# Patient Record
Sex: MALE | URBAN - METROPOLITAN AREA
[De-identification: names, ages, dates, MRNs, and addresses within clinical notes are randomized per-mention and may not be internally consistent; named-entity substitution may affect disease eponyms.]

---

## 2024-03-14 ENCOUNTER — OFFICE VISIT (OUTPATIENT)
Dept: PRIMARY CARE | Facility: HOSPITAL | Age: 54
End: 2024-03-14
Payer: COMMERCIAL

## 2024-03-14 VITALS
HEART RATE: 95 BPM | DIASTOLIC BLOOD PRESSURE: 81 MMHG | HEIGHT: 72 IN | BODY MASS INDEX: 22.62 KG/M2 | SYSTOLIC BLOOD PRESSURE: 128 MMHG | TEMPERATURE: 97.1 F | OXYGEN SATURATION: 93 % | WEIGHT: 167 LBS

## 2024-03-14 DIAGNOSIS — F17.200 SMOKING: ICD-10-CM

## 2024-03-14 DIAGNOSIS — E03.9 HYPOTHYROIDISM, UNSPECIFIED TYPE: ICD-10-CM

## 2024-03-14 DIAGNOSIS — E55.9 VITAMIN D DEFICIENCY: ICD-10-CM

## 2024-03-14 DIAGNOSIS — G89.29 OTHER CHRONIC PAIN: ICD-10-CM

## 2024-03-14 DIAGNOSIS — R79.89 LOW TESTOSTERONE: ICD-10-CM

## 2024-03-14 DIAGNOSIS — R06.02 SHORTNESS OF BREATH: Primary | ICD-10-CM

## 2024-03-14 DIAGNOSIS — R35.0 BENIGN PROSTATIC HYPERPLASIA WITH URINARY FREQUENCY: ICD-10-CM

## 2024-03-14 DIAGNOSIS — N40.1 BENIGN PROSTATIC HYPERPLASIA WITH URINARY FREQUENCY: ICD-10-CM

## 2024-03-14 DIAGNOSIS — E78.5 DYSLIPIDEMIA: ICD-10-CM

## 2024-03-14 DIAGNOSIS — R05.2 SUBACUTE COUGH: ICD-10-CM

## 2024-03-14 PROBLEM — G54.8 PHANTOM PAIN: Status: ACTIVE | Noted: 2024-03-14

## 2024-03-14 PROBLEM — R31.9 HEMATURIA: Status: ACTIVE | Noted: 2024-03-14

## 2024-03-14 PROBLEM — R52 PHANTOM PAIN: Status: ACTIVE | Noted: 2024-03-14

## 2024-03-14 PROBLEM — C41.9 OSTEOSARCOMA (MULTI): Status: ACTIVE | Noted: 2024-03-14

## 2024-03-14 PROBLEM — F98.8 ATTENTION DEFICIT DISORDER: Status: ACTIVE | Noted: 2024-03-14

## 2024-03-14 PROCEDURE — 99205 OFFICE O/P NEW HI 60 MIN: CPT | Performed by: INTERNAL MEDICINE

## 2024-03-14 PROCEDURE — 99215 OFFICE O/P EST HI 40 MIN: CPT | Performed by: INTERNAL MEDICINE

## 2024-03-14 RX ORDER — DEXTROAMPHETAMINE SACCHARATE, AMPHETAMINE ASPARTATE, DEXTROAMPHETAMINE SULFATE AND AMPHETAMINE SULFATE 5; 5; 5; 5 MG/1; MG/1; MG/1; MG/1
1 TABLET ORAL 3 TIMES DAILY
COMMUNITY
Start: 2024-03-07

## 2024-03-14 RX ORDER — BUPRENORPHINE AND NALOXONE 8; 2 MG/1; MG/1
FILM, SOLUBLE BUCCAL; SUBLINGUAL
COMMUNITY

## 2024-03-14 RX ORDER — DULOXETIN HYDROCHLORIDE 30 MG/1
30 CAPSULE, DELAYED RELEASE ORAL DAILY
COMMUNITY
Start: 2024-03-04

## 2024-03-14 RX ORDER — TESTOSTERONE CYPIONATE 200 MG/ML
INJECTION, SOLUTION INTRAMUSCULAR
COMMUNITY

## 2024-03-14 RX ORDER — AZITHROMYCIN 250 MG/1
TABLET, FILM COATED ORAL
Qty: 6 TABLET | Refills: 0 | Status: SHIPPED | OUTPATIENT
Start: 2024-03-14 | End: 2024-03-19

## 2024-03-14 RX ORDER — METHYLPREDNISOLONE 4 MG/1
TABLET ORAL
Qty: 21 TABLET | Refills: 0 | Status: SHIPPED | OUTPATIENT
Start: 2024-03-14 | End: 2024-03-21

## 2024-03-14 ASSESSMENT — ENCOUNTER SYMPTOMS
LOSS OF SENSATION IN FEET: 0
OCCASIONAL FEELINGS OF UNSTEADINESS: 0
DEPRESSION: 0

## 2024-03-14 NOTE — PROGRESS NOTES
Chief Complaint   Patient presents with    [facility]         History Of Present Illness:    Hitesh Cruz is a 53 y.o. male presenting to [facility] and address concerns about cough, shortness of breath and cancer screening.  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  He was treated with cisplatin based chemotherapy and Adriamycin.  He recently relocated to [place] and is interested in updating cancer screening.  Recently has been coughing more and feeling out of breath, and is interested in arranging a CAT scan of his chest.  He developed attention deficit disorder after his chemotherapy, and is currently managed with Adderall.  He has a history of low testosterone currently on testosterone therapy managed by an outside provider.  He reports a history of a thyroid disorder.  One of his PET scans demonstrated increased uptake in his thyroid at the time of his osteosarcoma surgery.  He did not have any follow-up for his thyroid after that.  There is a normal TSH in his chart from 1 year ago.  Hitesh continues to have significant phantom pain in his right leg.  This is managed by pain specialist.  He is currently treated with duloxetine and Suboxone.  He does not feel that the duloxetine is effective, but he is having difficulty tapering off of it.  If he skips a dose he gets unusual sensations in his brain and sensations of flashing light.      Active Medical Problems:  Patient Active Problem List    Diagnosis Date Noted    Shortness of breath 03/17/2024    Other chronic pain 03/17/2024    Vitamin D deficiency 03/17/2024    Low testosterone 03/17/2024    Hypothyroidism 03/17/2024    Benign prostatic hyperplasia with urinary frequency 03/17/2024    Hematuria 03/14/2024    Osteosarcoma (CMS/HCC) 03/14/2024    Smoking 03/14/2024    Dyslipidemia  03/14/2024    Attention deficit disorder 03/14/2024    Phantom pain (CMS/HCC) 03/14/2024       Past Medical History:  Past Medical History:   Diagnosis Date    Attention deficit disorder     Hematuria     Hyperlipidemia     Osteosarcoma (CMS/HCC)     Phantom pain (CMS/HCC)     Smoking     self rolled       Past Surgical History:  Past Surgical History:   Procedure Laterality Date    LEG AMPUTATION AT HIP Right     LEG SURGERY Right     Desmoid tumor, age 14         Social History:  Social History     Tobacco Use    Smoking status: Some Days     Types: Cigarettes    Smokeless tobacco: Never   Substance Use Topics    Alcohol use: Never    Drug use: Never         Family History:  Family History   Problem Relation Name Age of Onset    Chronic bronchitis Mother      Rheum arthritis Mother      Arrhythmia Father      Transient ischemic attack Father      No Known Problems Sister      Asperger's syndrome Daughter          Allergies:  Patient has no known allergies.    Outpatient Medications:    Current Outpatient Medications:     amphetamine-dextroamphetamine (Adderall) 20 mg tablet, Take 1 tablet (20 mg) by mouth 3 times a day., Disp: , Rfl:     DULoxetine (Cymbalta) 30 mg DR capsule, Take 1 capsule (30 mg) by mouth once daily., Disp: , Rfl:     azithromycin (Zithromax) 250 mg tablet, Take 2 tablets (500 mg) by mouth once daily for 1 day, THEN 1 tablet (250 mg) once daily for 4 days. Take 2 tabs (500 mg) by mouth today, than 1 daily for 4 days.., Disp: 6 tablet, Rfl: 0    buprenorphine-naloxone (Suboxone) 8-2 mg SL film, TAKE 1 FILM DAILY AND 1/2 FILM EXTRA AS NEEDED FOR PAIN, Disp: , Rfl:     methylPREDNISolone (Medrol Dospak) 4 mg tablets, Take as directed on package., Disp: 21 tablet, Rfl: 0    testosterone cypionate (Depo-Testosterone) 200 mg/mL injection, Inject into the muscle every 14 (fourteen) days., Disp: , Rfl:     Review of Systems:  Pertinent positives in review of systems outlined above.  Complete ROS  otherwise negative.         Last Recorded Vitals:  Vitals:    03/14/24 1200   BP: 128/81   BP Location: Left arm   Patient Position: Sitting   BP Cuff Size: Large adult   Pulse: 95   Temp: 36.2 °C (97.1 °F)   SpO2: 93%   Weight: 75.8 kg (167 lb)   Height: 1.829 m (6')   Body mass index is 22.65 kg/m².            Assessment/Plan   Problem List Items Addressed This Visit       Smoking     Advised on complete tobacco cessation.  Will arrange LD CT chest for screening.          Relevant Orders    CT lung screening low dose    Dyslipidemia     Fasting lipids due now          Relevant Orders    Comprehensive Metabolic Panel    Lipid Panel    Shortness of breath - Primary     Diffuse wheezing and rhales on exam. Will treat with azithromycin and medrol.  To use albuterol prn cough.  To follow up in one week.  LDCT ordered for lung CA screen.         Relevant Orders    Comprehensive Metabolic Panel    CBC and Auto Differential    Other chronic pain    Relevant Orders    Comprehensive Metabolic Panel    Vitamin D deficiency     25OHD level to be included with labs.  Will arrange DXA at next visit.  Low D and smoking increase risk for metabolic bone disease.          Relevant Orders    Comprehensive Metabolic Panel    Vitamin D 25-Hydroxy,Total (for eval of Vitamin D levels)    Low testosterone     Has hx of low T managed by outside provider.  Inconsistent with injections.           Relevant Orders    Comprehensive Metabolic Panel    Hypothyroidism     Has hx of FDG avid nodule(s) on PET in past.  No mass in thyroid on exam.  Will start with TSH and Free T3         Relevant Orders    TSH with reflex to Free T4 if abnormal    Benign prostatic hyperplasia with urinary frequency    Relevant Orders    Prostate Specific Antigen    Urinalysis with Reflex Microscopic     Other Visit Diagnoses       Subacute cough        Relevant Medications    methylPREDNISolone (Medrol Dospak) 4 mg tablets    azithromycin (Zithromax) 250 mg tablet               A total of 60 minutes was spent reviewing the chart and recent testing and discussing plan of care.     Randall Caldwell MD

## 2024-03-17 PROBLEM — R35.0 BENIGN PROSTATIC HYPERPLASIA WITH URINARY FREQUENCY: Status: ACTIVE | Noted: 2024-03-17

## 2024-03-17 PROBLEM — E55.9 VITAMIN D DEFICIENCY: Status: ACTIVE | Noted: 2024-03-17

## 2024-03-17 PROBLEM — G89.29 OTHER CHRONIC PAIN: Status: ACTIVE | Noted: 2024-03-17

## 2024-03-17 PROBLEM — N40.1 BENIGN PROSTATIC HYPERPLASIA WITH URINARY FREQUENCY: Status: ACTIVE | Noted: 2024-03-17

## 2024-03-17 PROBLEM — R79.89 LOW TESTOSTERONE: Status: ACTIVE | Noted: 2024-03-17

## 2024-03-17 PROBLEM — E03.9 HYPOTHYROIDISM: Status: ACTIVE | Noted: 2024-03-17

## 2024-03-17 PROBLEM — R06.02 SHORTNESS OF BREATH: Status: ACTIVE | Noted: 2024-03-17

## 2024-03-17 NOTE — ASSESSMENT & PLAN NOTE
Diffuse wheezing and rhales on exam. Will treat with azithromycin and medrol.  To use albuterol prn cough.  To follow up in one week.  LDCT ordered for lung CA screen.

## 2024-03-17 NOTE — ASSESSMENT & PLAN NOTE
25OHD level to be included with labs.  Will arrange DXA at next visit.  Low D and smoking increase risk for metabolic bone disease.

## 2024-03-17 NOTE — ASSESSMENT & PLAN NOTE
Has hx of FDG avid nodule(s) on PET in past.  No mass in thyroid on exam.  Will start with TSH and Free T3

## 2024-04-01 ENCOUNTER — LAB (OUTPATIENT)
Dept: LAB | Facility: LAB | Age: 54
End: 2024-04-01
Payer: COMMERCIAL

## 2024-04-01 DIAGNOSIS — N40.1 BENIGN PROSTATIC HYPERPLASIA WITH URINARY FREQUENCY: ICD-10-CM

## 2024-04-01 DIAGNOSIS — R79.89 LOW TESTOSTERONE: ICD-10-CM

## 2024-04-01 DIAGNOSIS — G89.29 OTHER CHRONIC PAIN: ICD-10-CM

## 2024-04-01 DIAGNOSIS — R06.02 SHORTNESS OF BREATH: ICD-10-CM

## 2024-04-01 DIAGNOSIS — E78.5 DYSLIPIDEMIA: ICD-10-CM

## 2024-04-01 DIAGNOSIS — E03.9 HYPOTHYROIDISM, UNSPECIFIED TYPE: ICD-10-CM

## 2024-04-01 DIAGNOSIS — R35.0 BENIGN PROSTATIC HYPERPLASIA WITH URINARY FREQUENCY: ICD-10-CM

## 2024-04-01 DIAGNOSIS — E55.9 VITAMIN D DEFICIENCY: ICD-10-CM

## 2024-04-01 LAB
25(OH)D3 SERPL-MCNC: 31 NG/ML (ref 30–100)
ALBUMIN SERPL BCP-MCNC: 4.1 G/DL (ref 3.4–5)
ALP SERPL-CCNC: 72 U/L (ref 33–120)
ALT SERPL W P-5'-P-CCNC: 14 U/L (ref 10–52)
ANION GAP SERPL CALC-SCNC: 10 MMOL/L (ref 10–20)
AST SERPL W P-5'-P-CCNC: 12 U/L (ref 9–39)
BASOPHILS # BLD AUTO: 0.09 X10*3/UL (ref 0–0.1)
BASOPHILS NFR BLD AUTO: 0.9 %
BILIRUB SERPL-MCNC: 0.4 MG/DL (ref 0–1.2)
BUN SERPL-MCNC: 12 MG/DL (ref 6–23)
CALCIUM SERPL-MCNC: 9.3 MG/DL (ref 8.6–10.3)
CHLORIDE SERPL-SCNC: 103 MMOL/L (ref 98–107)
CHOLEST SERPL-MCNC: 243 MG/DL (ref 0–199)
CHOLESTEROL/HDL RATIO: 6.2
CO2 SERPL-SCNC: 29 MMOL/L (ref 21–32)
CREAT SERPL-MCNC: 0.81 MG/DL (ref 0.5–1.3)
EGFRCR SERPLBLD CKD-EPI 2021: >90 ML/MIN/1.73M*2
EOSINOPHIL # BLD AUTO: 0.17 X10*3/UL (ref 0–0.7)
EOSINOPHIL NFR BLD AUTO: 1.7 %
ERYTHROCYTE [DISTWIDTH] IN BLOOD BY AUTOMATED COUNT: 13.7 % (ref 11.5–14.5)
GLUCOSE SERPL-MCNC: 105 MG/DL (ref 74–99)
HCT VFR BLD AUTO: 45.8 % (ref 41–52)
HDLC SERPL-MCNC: 38.9 MG/DL
HGB BLD-MCNC: 15 G/DL (ref 13.5–17.5)
IMM GRANULOCYTES # BLD AUTO: 0.03 X10*3/UL (ref 0–0.7)
IMM GRANULOCYTES NFR BLD AUTO: 0.3 % (ref 0–0.9)
LDLC SERPL CALC-MCNC: ABNORMAL MG/DL
LYMPHOCYTES # BLD AUTO: 1.68 X10*3/UL (ref 1.2–4.8)
LYMPHOCYTES NFR BLD AUTO: 16.9 %
MCH RBC QN AUTO: 30.2 PG (ref 26–34)
MCHC RBC AUTO-ENTMCNC: 32.8 G/DL (ref 32–36)
MCV RBC AUTO: 92 FL (ref 80–100)
MONOCYTES # BLD AUTO: 0.7 X10*3/UL (ref 0.1–1)
MONOCYTES NFR BLD AUTO: 7.1 %
NEUTROPHILS # BLD AUTO: 7.25 X10*3/UL (ref 1.2–7.7)
NEUTROPHILS NFR BLD AUTO: 73.1 %
NON HDL CHOLESTEROL: 204 MG/DL (ref 0–149)
NRBC BLD-RTO: 0 /100 WBCS (ref 0–0)
PLATELET # BLD AUTO: 255 X10*3/UL (ref 150–450)
POTASSIUM SERPL-SCNC: 4 MMOL/L (ref 3.5–5.3)
PROT SERPL-MCNC: 6.4 G/DL (ref 6.4–8.2)
PSA SERPL-MCNC: 1.47 NG/ML
RBC # BLD AUTO: 4.96 X10*6/UL (ref 4.5–5.9)
SODIUM SERPL-SCNC: 138 MMOL/L (ref 136–145)
TRIGL SERPL-MCNC: 422 MG/DL (ref 0–149)
TSH SERPL-ACNC: 0.86 MIU/L (ref 0.44–3.98)
VLDL: ABNORMAL
WBC # BLD AUTO: 9.9 X10*3/UL (ref 4.4–11.3)

## 2024-04-01 PROCEDURE — 80053 COMPREHEN METABOLIC PANEL: CPT

## 2024-04-01 PROCEDURE — 82306 VITAMIN D 25 HYDROXY: CPT

## 2024-04-01 PROCEDURE — 84443 ASSAY THYROID STIM HORMONE: CPT

## 2024-04-01 PROCEDURE — 80061 LIPID PANEL: CPT

## 2024-04-01 PROCEDURE — 84153 ASSAY OF PSA TOTAL: CPT

## 2024-04-01 PROCEDURE — 36415 COLL VENOUS BLD VENIPUNCTURE: CPT

## 2024-04-01 PROCEDURE — 85025 COMPLETE CBC W/AUTO DIFF WBC: CPT

## 2024-04-07 ENCOUNTER — PATIENT MESSAGE (OUTPATIENT)
Dept: PRIMARY CARE | Facility: HOSPITAL | Age: 54
End: 2024-04-07
Payer: COMMERCIAL

## 2024-04-07 DIAGNOSIS — E78.2 MIXED DYSLIPIDEMIA: ICD-10-CM

## 2024-04-07 DIAGNOSIS — R73.9 ELEVATED BLOOD SUGAR: Primary | ICD-10-CM

## 2025-08-23 ENCOUNTER — OFFICE VISIT (OUTPATIENT)
Dept: URGENT CARE | Age: 55
End: 2025-08-23
Payer: COMMERCIAL

## 2025-08-23 VITALS
SYSTOLIC BLOOD PRESSURE: 125 MMHG | RESPIRATION RATE: 16 BRPM | TEMPERATURE: 97.7 F | HEART RATE: 88 BPM | OXYGEN SATURATION: 95 % | DIASTOLIC BLOOD PRESSURE: 70 MMHG

## 2025-08-23 DIAGNOSIS — L08.9: ICD-10-CM

## 2025-08-23 DIAGNOSIS — L02.416 ABSCESS OF LEFT LOWER LEG: ICD-10-CM

## 2025-08-23 DIAGNOSIS — S80.922A: ICD-10-CM

## 2025-08-23 DIAGNOSIS — L03.116 CELLULITIS OF LEFT LOWER EXTREMITY: Primary | ICD-10-CM

## 2025-08-23 RX ORDER — MUPIROCIN 20 MG/G
OINTMENT TOPICAL
Qty: 22 G | Refills: 0 | Status: SHIPPED | OUTPATIENT
Start: 2025-08-23 | End: 2025-09-02

## 2025-08-23 RX ORDER — CEPHALEXIN 500 MG/1
500 CAPSULE ORAL 4 TIMES DAILY
Qty: 40 CAPSULE | Refills: 0 | Status: SHIPPED | OUTPATIENT
Start: 2025-08-23 | End: 2025-09-02

## 2025-08-23 ASSESSMENT — ENCOUNTER SYMPTOMS
WOUND: 1
COLOR CHANGE: 1